# Patient Record
Sex: FEMALE | Race: BLACK OR AFRICAN AMERICAN | URBAN - METROPOLITAN AREA
[De-identification: names, ages, dates, MRNs, and addresses within clinical notes are randomized per-mention and may not be internally consistent; named-entity substitution may affect disease eponyms.]

---

## 2017-10-23 NOTE — PROCEDURE NOTE: CLINICAL
PROCEDURE NOTE: YAG Capsulotomy OD. Diagnosis: Visually Significant PCO. Anesthesia: Topical. Prior to treatment, the risks/benefits/alternatives were discussed. The patient wished to proceed with procedure. Power = 2.8 mJ. Number of pulses = 19. Patient tolerated procedure well. There were no complications. 1 gtt of Alphagan was instilled after laser. Post laser IOP = 10 mmHg. Post-procedure instructions given. Smita Ortez

## 2017-10-31 NOTE — PROCEDURE NOTE: CLINICAL
PROCEDURE NOTE: YAG Capsulotomy OS. Diagnosis: Visually Significant PCO. Anesthesia: Topical. Prior to treatment, the risks/benefits/alternatives were discussed. The patient wished to proceed with procedure. Power = 2.6 mJ. Number of pulses = 18. Patient tolerated procedure well. There were no complications. 1 gtt of Alphagan was instilled after laser. Post laser IOP = 17 mmHg. Post-procedure instructions given. Nigel Anderson

## 2017-11-07 NOTE — PATIENT DISCUSSION
mk trial framed and pt stated that he did notice a difference and that he would rosy his va that was in the trial frame and would like to proceed wih laik OD I lasik OD goal cori.

## 2019-08-30 ENCOUNTER — IMPORTED ENCOUNTER (OUTPATIENT)
Dept: URBAN - METROPOLITAN AREA CLINIC 50 | Facility: CLINIC | Age: 59
End: 2019-08-30

## 2019-09-04 ENCOUNTER — IMPORTED ENCOUNTER (OUTPATIENT)
Dept: URBAN - METROPOLITAN AREA CLINIC 50 | Facility: CLINIC | Age: 59
End: 2019-09-04

## 2019-09-04 NOTE — PATIENT DISCUSSION
"""michelle today.  verified patient has kcn ou. recommend patient continue with gas perm contact ""

## 2020-06-24 ENCOUNTER — IMPORTED ENCOUNTER (OUTPATIENT)
Dept: URBAN - METROPOLITAN AREA CLINIC 50 | Facility: CLINIC | Age: 60
End: 2020-06-24

## 2020-07-02 ENCOUNTER — IMPORTED ENCOUNTER (OUTPATIENT)
Dept: URBAN - METROPOLITAN AREA CLINIC 50 | Facility: CLINIC | Age: 60
End: 2020-07-02

## 2020-08-12 ENCOUNTER — IMPORTED ENCOUNTER (OUTPATIENT)
Dept: URBAN - METROPOLITAN AREA CLINIC 50 | Facility: CLINIC | Age: 60
End: 2020-08-12

## 2020-09-09 ENCOUNTER — IMPORTED ENCOUNTER (OUTPATIENT)
Dept: URBAN - METROPOLITAN AREA CLINIC 50 | Facility: CLINIC | Age: 60
End: 2020-09-09

## 2020-09-09 NOTE — PATIENT DISCUSSION
"""Obtained a new refraction on patient at today's visit.  Will finalize glasses prescription for patient ""

## 2020-10-16 ENCOUNTER — IMPORTED ENCOUNTER (OUTPATIENT)
Dept: URBAN - METROPOLITAN AREA CLINIC 50 | Facility: CLINIC | Age: 60
End: 2020-10-16

## 2020-10-16 NOTE — PATIENT DISCUSSION
"""Advised patient to discontinue the use of Kennan Advance and start using Simplus. Patient given final contact lens prescription.  10/16/2020"""

## 2020-11-11 NOTE — PATIENT DISCUSSION
New spectacle Rx issued to patient. Patient may continue his 2018 habitual Rx since very little change.  RTC [id number] for annual exam.

## 2021-01-21 ENCOUNTER — IMPORTED ENCOUNTER (OUTPATIENT)
Dept: URBAN - METROPOLITAN AREA CLINIC 50 | Facility: CLINIC | Age: 61
End: 2021-01-21

## 2021-05-27 ENCOUNTER — PROBLEM (OUTPATIENT)
Dept: URBAN - METROPOLITAN AREA CLINIC 50 | Facility: CLINIC | Age: 61
End: 2021-05-27

## 2021-05-27 DIAGNOSIS — E11.9: ICD-10-CM

## 2021-05-27 DIAGNOSIS — H18.002: ICD-10-CM

## 2021-05-27 PROCEDURE — 92012 INTRM OPH EXAM EST PATIENT: CPT

## 2021-05-27 PROCEDURE — 92134 CPTRZ OPH DX IMG PST SGM RTA: CPT

## 2021-05-27 PROCEDURE — 92285 EXTERNAL OCULAR PHOTOGRAPHY: CPT

## 2021-05-27 ASSESSMENT — VISUAL ACUITY
OD_PH: 20/40-2
OD_CC: 20/60+1
OS_CC: 20/70-1
OU_CC: J1@14IN
OS_PH: 20/60-1
OU_CC: 20/50 BLURRY

## 2021-05-27 NOTE — PATIENT DISCUSSION
Scattered white deposits on cornea under RGP lens. When lens was removed there was 1 protein deposit on the epithelium and 1 area of pooling where the protein was deposited on the back of the lens. Hydrapeg was ordered on her RGP lenses and that appears to be causing the protein deposits. Will not order lenses in the future with the coating. That is also likely causing the visual distortion. Will order the same RGP lenses that she has that are blue with a dot on the right.

## 2021-05-27 NOTE — PATIENT DISCUSSION
Unable to fully assess as she is not dilated. Obtained OCT at today's visit. No edema was found. Her diabetes is not likely the cause of the decrease in vision. Will continue to monitor.

## 2021-06-14 ASSESSMENT — VISUAL ACUITY
OD_CC: J1@ 16 IN
OD_CC: 20/25+3
OS_CC: J3
OS_CC: 20/25+1
OD_OTHER: 20/50-. 20/400.
OS_PH: 20/20-
OS_PH: 20/30
OS_BAT: 20/50-
OD_CC: 20/40+-
OD_CC: J3
OD_CC: 20/20
OS_CC: 20/30
OS_CC: 20/100-
OD_CC: 20/25-2
OS_CC: 20/30+2
OS_CC: J7
OD_PH: 20/25-
OS_OTHER: 20/50-. 20/60-.
OS_CC: J1@ 16 IN
OD_CC: J7
OD_BAT: 20/50-

## 2021-06-14 ASSESSMENT — TONOMETRY
OD_IOP_MMHG: 18
OS_IOP_MMHG: 14
OS_IOP_MMHG: 14
OD_IOP_MMHG: 14
OD_IOP_MMHG: 14
OS_IOP_MMHG: 14

## 2021-10-22 ENCOUNTER — ANNUAL COMPREHENSIVE EXAM (OUTPATIENT)
Dept: URBAN - METROPOLITAN AREA CLINIC 53 | Facility: CLINIC | Age: 61
End: 2021-10-22

## 2021-10-22 DIAGNOSIS — E11.9: ICD-10-CM

## 2021-10-22 DIAGNOSIS — H25.13: ICD-10-CM

## 2021-10-22 DIAGNOSIS — Z79.4: ICD-10-CM

## 2021-10-22 PROCEDURE — 92014 COMPRE OPH EXAM EST PT 1/>: CPT

## 2021-10-22 ASSESSMENT — VISUAL ACUITY
OD_GLARE: 20/40
OS_GLARE: 20/30
OS_CC: 20/25-1
OD_CC: 20/30+2
OU_CC: J1+
OS_GLARE: 20/40
OD_GLARE: 20/40

## 2021-10-22 ASSESSMENT — TONOMETRY
OD_IOP_MMHG: 14
OS_IOP_MMHG: 15

## 2021-10-22 NOTE — PATIENT DISCUSSION
No glasses Rx today. Patient sees a new doctor next week. Could not remember the name and said she would call with name so we can send diabetic letter. Patient to call once her A1c becomes stable, and we will go forth with refraction. Also discussed with patient about possibly doing cataract surgery early (once she has become stable), and therefore when her blood sugar fluctuates she would not have to worry about her vision fluctuating so much.

## 2022-03-07 ENCOUNTER — ESTABLISHED PATIENT (OUTPATIENT)
Dept: URBAN - METROPOLITAN AREA CLINIC 52 | Facility: CLINIC | Age: 62
End: 2022-03-07

## 2022-03-07 DIAGNOSIS — H18.603: ICD-10-CM

## 2022-03-07 DIAGNOSIS — H43.812: ICD-10-CM

## 2022-03-07 DIAGNOSIS — Z79.4: ICD-10-CM

## 2022-03-07 DIAGNOSIS — E11.9: ICD-10-CM

## 2022-03-07 DIAGNOSIS — H25.13: ICD-10-CM

## 2022-03-07 PROCEDURE — 92014 COMPRE OPH EXAM EST PT 1/>: CPT

## 2022-03-07 PROCEDURE — 92134 CPTRZ OPH DX IMG PST SGM RTA: CPT

## 2022-03-07 ASSESSMENT — VISUAL ACUITY
OS_CC: J1 @ 14IN
OU_CC: 20/20-2
OD_CC: 20/25
OD_CC: J1 @ 14IN
OS_CC: 20/40-1
OU_CC: J1+ @ 14IN

## 2022-03-07 ASSESSMENT — TONOMETRY
OS_IOP_MMHG: 15
OD_IOP_MMHG: 14

## 2022-06-07 ENCOUNTER — ESTABLISHED PATIENT (OUTPATIENT)
Dept: URBAN - METROPOLITAN AREA CLINIC 53 | Facility: CLINIC | Age: 62
End: 2022-06-07

## 2022-06-07 DIAGNOSIS — E11.9: ICD-10-CM

## 2022-06-07 DIAGNOSIS — H43.813: ICD-10-CM

## 2022-06-07 DIAGNOSIS — H25.13: ICD-10-CM

## 2022-06-07 DIAGNOSIS — Z79.4: ICD-10-CM

## 2022-06-07 PROCEDURE — 92014 COMPRE OPH EXAM EST PT 1/>: CPT

## 2022-06-07 PROCEDURE — 92134 CPTRZ OPH DX IMG PST SGM RTA: CPT

## 2022-06-07 ASSESSMENT — VISUAL ACUITY
OS_GLARE: 20/50
OD_PH: 20/30
OD_GLARE: 20/30
OS_GLARE: 20/60
OS_CC: 20/50
OD_GLARE: 20/50
OD_CC: 20/50-2

## 2022-06-07 ASSESSMENT — TONOMETRY
OS_IOP_MMHG: 14
OD_IOP_MMHG: 15

## 2022-06-07 NOTE — PATIENT DISCUSSION
New spectacle Rx issued to patient. Patient may continue his 2018 habitual Rx since very little change.  RTC [id number] for annual exam.

## 2022-06-07 NOTE — PATIENT DISCUSSION
Patient is asymptomatic to upper lids at this time. Superior lids are not sufficient to interfere with line of sight; Monitor only.

## 2022-06-07 NOTE — PATIENT DISCUSSION
Patient has an appointment scheduled in July with her new endocrinologist. Patient is not sure of their name.

## 2022-06-07 NOTE — PATIENT DISCUSSION
Patient has been having vision fluctuation due to her blood sugar fluctuation. Patient to call once her A1c becomes stable, and we will go forth with refraction.

## 2022-11-03 ENCOUNTER — ESTABLISHED PATIENT (OUTPATIENT)
Dept: URBAN - METROPOLITAN AREA CLINIC 50 | Facility: CLINIC | Age: 62
End: 2022-11-03

## 2022-11-03 DIAGNOSIS — Z97.3: ICD-10-CM

## 2022-11-03 DIAGNOSIS — H18.603: ICD-10-CM

## 2022-11-03 DIAGNOSIS — Z79.4: ICD-10-CM

## 2022-11-03 DIAGNOSIS — E11.9: ICD-10-CM

## 2022-11-03 DIAGNOSIS — H52.13: ICD-10-CM

## 2022-11-03 PROCEDURE — 92012 INTRM OPH EXAM EST PATIENT: CPT

## 2022-11-03 ASSESSMENT — TONOMETRY
OS_IOP_MMHG: 15
OD_IOP_MMHG: 15

## 2022-11-03 ASSESSMENT — VISUAL ACUITY
OU_CC: 20/25+2
OS_CC: 20/25
OD_CC: 20/25

## 2022-11-03 NOTE — PATIENT DISCUSSION
D/w patient the need to be out of RGPs for 48 hours at the least for a more accurate refraction. Patient understands and will schedule a refraction with a senior tech on a Monday. Patient aware she will need a ride to appointment and that after the prescription is finalized she may wear them again. Patient is also aware her glasses will not be as clear than it is in RGPs due to her keratoconus.

## 2022-11-28 ENCOUNTER — CONTACT LENSES/GLASSES VISIT (OUTPATIENT)
Dept: URBAN - METROPOLITAN AREA CLINIC 50 | Facility: CLINIC | Age: 62
End: 2022-11-28

## 2022-11-28 DIAGNOSIS — H52.13: ICD-10-CM

## 2022-11-28 DIAGNOSIS — H18.603: ICD-10-CM

## 2022-11-28 PROCEDURE — 92015 DETERMINE REFRACTIVE STATE: CPT

## 2022-11-28 ASSESSMENT — VISUAL ACUITY
OS_SC: 20/100+
OD_SC: 20/70+1

## 2022-11-28 ASSESSMENT — KERATOMETRY
OD_K2POWER_DIOPTERS: 44.75
OD_AXISANGLE_DEGREES: 105
OD_AXISANGLE2_DEGREES: 15
OS_AXISANGLE2_DEGREES: 166
OD_K1POWER_DIOPTERS: 48.00
OS_K2POWER_DIOPTERS: 44.50
OS_K1POWER_DIOPTERS: 48.75
OS_AXISANGLE_DEGREES: 76

## 2023-04-07 ENCOUNTER — ESTABLISHED PATIENT (OUTPATIENT)
Dept: URBAN - METROPOLITAN AREA CLINIC 24 | Facility: CLINIC | Age: 63
End: 2023-04-07

## 2023-04-07 DIAGNOSIS — H18.603: ICD-10-CM

## 2023-04-07 DIAGNOSIS — H52.13: ICD-10-CM

## 2023-04-07 PROCEDURE — 92310N CONTACT LENS F/U, 3 OR LESS N/C

## 2023-04-07 ASSESSMENT — KERATOMETRY
OS_K2POWER_DIOPTERS: 44.50
OS_K1POWER_DIOPTERS: 48.75
OD_K2POWER_DIOPTERS: 44.75
OD_AXISANGLE_DEGREES: 105
OD_K1POWER_DIOPTERS: 48.00
OD_AXISANGLE2_DEGREES: 15
OS_AXISANGLE2_DEGREES: 166
OS_AXISANGLE_DEGREES: 76

## 2023-04-07 ASSESSMENT — VISUAL ACUITY
OS_CC: 20/25
OD_CC: 20/25
OU_CC: 20/20

## 2024-03-15 ENCOUNTER — APPOINTMENT (RX ONLY)
Dept: URBAN - METROPOLITAN AREA CLINIC 58 | Facility: CLINIC | Age: 64
Setting detail: DERMATOLOGY
End: 2024-03-15

## 2024-03-15 DIAGNOSIS — L82.1 OTHER SEBORRHEIC KERATOSIS: ICD-10-CM

## 2024-03-15 DIAGNOSIS — L82.0 INFLAMED SEBORRHEIC KERATOSIS: ICD-10-CM | Status: WORSENING

## 2024-03-15 DIAGNOSIS — L72.11 PILAR CYST: ICD-10-CM | Status: STABLE

## 2024-03-15 DIAGNOSIS — L91.8 OTHER HYPERTROPHIC DISORDERS OF THE SKIN: ICD-10-CM | Status: STABLE

## 2024-03-15 DIAGNOSIS — L73.8 OTHER SPECIFIED FOLLICULAR DISORDERS: ICD-10-CM | Status: STABLE

## 2024-03-15 DIAGNOSIS — L72.8 OTHER FOLLICULAR CYSTS OF THE SKIN AND SUBCUTANEOUS TISSUE: ICD-10-CM | Status: RESOLVING

## 2024-03-15 PROCEDURE — ? FULL BODY SKIN EXAM - DECLINED

## 2024-03-15 PROCEDURE — 17110 DESTRUCTION B9 LES UP TO 14: CPT

## 2024-03-15 PROCEDURE — ? COUNSELING

## 2024-03-15 PROCEDURE — 99202 OFFICE O/P NEW SF 15 MIN: CPT | Mod: 25

## 2024-03-15 PROCEDURE — ? LIQUID NITROGEN

## 2024-03-15 ASSESSMENT — LOCATION SIMPLE DESCRIPTION DERM
LOCATION SIMPLE: RIGHT ANTERIOR AXILLA
LOCATION SIMPLE: SCALP
LOCATION SIMPLE: LEFT CHEEK
LOCATION SIMPLE: RIGHT CLAVICULAR SKIN
LOCATION SIMPLE: LEFT ANTERIOR NECK
LOCATION SIMPLE: CHEST
LOCATION SIMPLE: RIGHT ANTERIOR NECK

## 2024-03-15 ASSESSMENT — LOCATION ZONE DERM
LOCATION ZONE: SCALP
LOCATION ZONE: FACE
LOCATION ZONE: NECK
LOCATION ZONE: AXILLAE
LOCATION ZONE: TRUNK

## 2024-03-15 ASSESSMENT — LOCATION DETAILED DESCRIPTION DERM
LOCATION DETAILED: LEFT INFERIOR LATERAL NECK
LOCATION DETAILED: RIGHT MEDIAL SUPERIOR CHEST
LOCATION DETAILED: LEFT SUPERIOR PARIETAL SCALP
LOCATION DETAILED: LEFT LATERAL SUPERIOR CHEST
LOCATION DETAILED: LEFT MEDIAL SUPERIOR CHEST
LOCATION DETAILED: STERNAL NOTCH
LOCATION DETAILED: RIGHT INFERIOR LATERAL NECK
LOCATION DETAILED: RIGHT CLAVICULAR SKIN
LOCATION DETAILED: RIGHT SUPERIOR PARIETAL SCALP
LOCATION DETAILED: UPPER STERNUM
LOCATION DETAILED: LEFT INFERIOR CENTRAL MALAR CHEEK
LOCATION DETAILED: RIGHT ANTERIOR AXILLA

## 2024-03-15 NOTE — HPI: CYST
How Severe Is Your Cyst?: mild
Is This A New Presentation, Or A Follow-Up?: Cyst
Additional History: Patient reports cyst ruptured and was treated by the ER and wound care.

## 2024-03-15 NOTE — PROCEDURE: LIQUID NITROGEN
Medical Necessity Information: It is in your best interest to select a reason for this procedure from the list below. All of these items fulfill various CMS LCD requirements except the new and changing color options.
Render Note In Bullet Format When Appropriate: No
Medical Necessity Clause: This procedure was medically necessary because the lesions that were treated were:
Show Aperture Variable?: Yes
Detail Level: Detailed
Spray Paint Text: The liquid nitrogen was applied to the skin utilizing a spray paint frosting technique.
Post-Care Instructions: I reviewed with the patient in detail post-care instructions. Patient is to wear sunprotection, and avoid picking at any of the treated lesions. Pt may apply Vaseline to crusted or scabbing areas.
Consent: The patient's consent was obtained including but not limited to risks of crusting, scabbing, blistering, scarring, darker or lighter pigmentary change, recurrence, incomplete removal and infection.

## 2024-07-08 ENCOUNTER — COMPREHENSIVE EXAM (OUTPATIENT)
Dept: URBAN - METROPOLITAN AREA CLINIC 24 | Facility: CLINIC | Age: 64
End: 2024-07-08

## 2024-07-08 DIAGNOSIS — Z79.4: ICD-10-CM

## 2024-07-08 DIAGNOSIS — E11.9: ICD-10-CM

## 2024-07-08 DIAGNOSIS — H25.811: ICD-10-CM

## 2024-07-08 DIAGNOSIS — H43.813: ICD-10-CM

## 2024-07-08 DIAGNOSIS — H25.12: ICD-10-CM

## 2024-07-08 DIAGNOSIS — H18.603: ICD-10-CM

## 2024-07-08 PROCEDURE — 99214 OFFICE O/P EST MOD 30 MIN: CPT

## 2024-07-08 ASSESSMENT — VISUAL ACUITY
OS_CC: J1@16
OS_CC: 20/40
OU_CC: 20/30-2
OD_CC: 20/30-2
OU_CC: J1@16
OD_CC: J1@16

## 2024-07-08 ASSESSMENT — TONOMETRY
OS_IOP_MMHG: 15
OD_IOP_MMHG: 14

## 2024-09-20 ENCOUNTER — APPOINTMENT (RX ONLY)
Dept: URBAN - METROPOLITAN AREA CLINIC 342 | Facility: CLINIC | Age: 64
Setting detail: DERMATOLOGY
End: 2024-09-20

## 2024-09-20 DIAGNOSIS — D22 MELANOCYTIC NEVI: ICD-10-CM

## 2024-09-20 DIAGNOSIS — Z12.83 ENCOUNTER FOR SCREENING FOR MALIGNANT NEOPLASM OF SKIN: ICD-10-CM

## 2024-09-20 DIAGNOSIS — L82.1 OTHER SEBORRHEIC KERATOSIS: ICD-10-CM

## 2024-09-20 DIAGNOSIS — L72.8 OTHER FOLLICULAR CYSTS OF THE SKIN AND SUBCUTANEOUS TISSUE: ICD-10-CM

## 2024-09-20 DIAGNOSIS — L81.4 OTHER MELANIN HYPERPIGMENTATION: ICD-10-CM

## 2024-09-20 PROBLEM — D48.5 NEOPLASM OF UNCERTAIN BEHAVIOR OF SKIN: Status: ACTIVE | Noted: 2024-09-20

## 2024-09-20 PROBLEM — D22.5 MELANOCYTIC NEVI OF TRUNK: Status: ACTIVE | Noted: 2024-09-20

## 2024-09-20 PROCEDURE — ? PRESCRIPTION MEDICATION MANAGEMENT

## 2024-09-20 PROCEDURE — ? BIOPSY BY SHAVE METHOD

## 2024-09-20 PROCEDURE — 11102 TANGNTL BX SKIN SINGLE LES: CPT

## 2024-09-20 PROCEDURE — 99214 OFFICE O/P EST MOD 30 MIN: CPT | Mod: 25

## 2024-09-20 PROCEDURE — ? TREATMENT REGIMEN

## 2024-09-20 PROCEDURE — ? FULL BODY SKIN EXAM

## 2024-09-20 PROCEDURE — ? PRESCRIPTION

## 2024-09-20 PROCEDURE — ? COUNSELING

## 2024-09-20 RX ORDER — DOXYCYCLINE HYCLATE 100 MG/1
TAKE ONE CAPSULE, GELATIN COATED ORAL BID
Qty: 14 | Refills: 0 | Status: ERX | COMMUNITY
Start: 2024-09-20

## 2024-09-20 RX ADMIN — DOXYCYCLINE HYCLATE TAKE ONE: 100 CAPSULE, GELATIN COATED ORAL at 00:00

## 2024-09-20 ASSESSMENT — LOCATION SIMPLE DESCRIPTION DERM
LOCATION SIMPLE: CHEST
LOCATION SIMPLE: RIGHT AXILLARY VAULT
LOCATION SIMPLE: RIGHT FOREARM
LOCATION SIMPLE: RIGHT UPPER BACK
LOCATION SIMPLE: LEFT FOREARM
LOCATION SIMPLE: LEFT UPPER BACK
LOCATION SIMPLE: LEFT FOREHEAD

## 2024-09-20 ASSESSMENT — LOCATION DETAILED DESCRIPTION DERM
LOCATION DETAILED: LEFT MID-UPPER BACK
LOCATION DETAILED: RIGHT AXILLARY VAULT
LOCATION DETAILED: LEFT LATERAL SUPERIOR CHEST
LOCATION DETAILED: RIGHT PROXIMAL DORSAL FOREARM
LOCATION DETAILED: RIGHT MID-UPPER BACK
LOCATION DETAILED: LEFT LATERAL FOREHEAD
LOCATION DETAILED: LEFT PROXIMAL DORSAL FOREARM

## 2024-09-20 ASSESSMENT — LOCATION ZONE DERM
LOCATION ZONE: AXILLAE
LOCATION ZONE: ARM
LOCATION ZONE: FACE
LOCATION ZONE: TRUNK

## 2024-09-20 NOTE — PROCEDURE: FULL BODY SKIN EXAM
From: Nithya Erickson  To: Sangeeta Sweeney  Sent: 10/15/2021 11:16 AM CDT  Subject: Follow up    Pawan Rutherford,   I see hat you have a follow up with me next week but also have been following with Dr. Benoit. I just wanted you to be aware that you do not need to follow with both of us. She's the migraine specialist so I see that she has an in depth plan for you. That being said, I'm happy to keep seeing you if you wanted, but I just wanted to let you know your options. I know it can get to be a bit much taking off work for various appointments.       Hope you're doing well otherwise.     -Nithya   
Detail Level: Simple
Price (Do Not Change): 0.00
Instructions: This plan will send the code FBSE to the PM system.  DO NOT or CHANGE the price.

## 2025-03-24 ENCOUNTER — APPOINTMENT (OUTPATIENT)
Age: 65
Setting detail: DERMATOLOGY
End: 2025-03-24

## 2025-03-24 DIAGNOSIS — L91.8 OTHER HYPERTROPHIC DISORDERS OF THE SKIN: ICD-10-CM | Status: STABLE

## 2025-03-24 DIAGNOSIS — L57.8 OTHER SKIN CHANGES DUE TO CHRONIC EXPOSURE TO NONIONIZING RADIATION: ICD-10-CM

## 2025-03-24 DIAGNOSIS — D22 MELANOCYTIC NEVI: ICD-10-CM | Status: STABLE

## 2025-03-24 DIAGNOSIS — L81.4 OTHER MELANIN HYPERPIGMENTATION: ICD-10-CM | Status: STABLE

## 2025-03-24 DIAGNOSIS — L82.1 OTHER SEBORRHEIC KERATOSIS: ICD-10-CM | Status: STABLE

## 2025-03-24 DIAGNOSIS — L82.0 INFLAMED SEBORRHEIC KERATOSIS: ICD-10-CM | Status: WORSENING

## 2025-03-24 DIAGNOSIS — D18.0 HEMANGIOMA: ICD-10-CM | Status: STABLE

## 2025-03-24 PROBLEM — D18.01 HEMANGIOMA OF SKIN AND SUBCUTANEOUS TISSUE: Status: ACTIVE | Noted: 2025-03-24

## 2025-03-24 PROBLEM — D22.5 MELANOCYTIC NEVI OF TRUNK: Status: ACTIVE | Noted: 2025-03-24

## 2025-03-24 PROCEDURE — ? ADDITIONAL NOTES

## 2025-03-24 PROCEDURE — 99213 OFFICE O/P EST LOW 20 MIN: CPT | Mod: 25

## 2025-03-24 PROCEDURE — ? LIQUID NITROGEN

## 2025-03-24 PROCEDURE — 17110 DESTRUCTION B9 LES UP TO 14: CPT

## 2025-03-24 PROCEDURE — ? COUNSELING

## 2025-03-24 PROCEDURE — ? TREATMENT REGIMEN

## 2025-03-24 ASSESSMENT — LOCATION SIMPLE DESCRIPTION DERM
LOCATION SIMPLE: LEFT FOREHEAD
LOCATION SIMPLE: RIGHT SHOULDER
LOCATION SIMPLE: RIGHT AXILLARY VAULT
LOCATION SIMPLE: LEFT ANTERIOR NECK
LOCATION SIMPLE: ABDOMEN
LOCATION SIMPLE: RIGHT ANTERIOR NECK
LOCATION SIMPLE: LEFT AXILLARY VAULT

## 2025-03-24 ASSESSMENT — LOCATION ZONE DERM
LOCATION ZONE: NECK
LOCATION ZONE: ARM
LOCATION ZONE: FACE
LOCATION ZONE: AXILLAE
LOCATION ZONE: TRUNK

## 2025-03-24 ASSESSMENT — LOCATION DETAILED DESCRIPTION DERM
LOCATION DETAILED: EPIGASTRIC SKIN
LOCATION DETAILED: RIGHT INFERIOR ANTERIOR NECK
LOCATION DETAILED: LEFT MEDIAL FOREHEAD
LOCATION DETAILED: LEFT AXILLARY VAULT
LOCATION DETAILED: RIGHT ANTERIOR SHOULDER
LOCATION DETAILED: LEFT INFERIOR LATERAL NECK
LOCATION DETAILED: RIGHT AXILLARY VAULT
LOCATION DETAILED: RIGHT CLAVICULAR NECK
LOCATION DETAILED: LEFT CLAVICULAR NECK
LOCATION DETAILED: RIGHT INFERIOR LATERAL NECK
LOCATION DETAILED: PERIUMBILICAL SKIN

## 2025-03-24 NOTE — PROCEDURE: ADDITIONAL NOTES
Render Risk Assessment In Note?: no
Detail Level: Zone
Additional Notes: Referred to YOVANNY Harper for consultation.

## 2025-03-24 NOTE — PROCEDURE: LIQUID NITROGEN
Post-Care Instructions: I reviewed with the patient in detail post-care instructions. Patient is to wear sunprotection, and avoid picking at any of the treated lesions. Pt may apply Vaseline to crusted or scabbing areas.
Spray Paint Text: The liquid nitrogen was applied to the skin utilizing a spray paint frosting technique.
Show Applicator Variable?: Yes
Detail Level: Detailed
Add 52 Modifier (Optional): no
Consent: The patient's consent was obtained including but not limited to risks of crusting, scabbing, blistering, scarring, darker or lighter pigmentary change, recurrence, incomplete removal and infection.
Medical Necessity Information: It is in your best interest to select a reason for this procedure from the list below. All of these items fulfill various CMS LCD requirements except the new and changing color options.
Medical Necessity Clause: This procedure was medically necessary because the lesions that were treated were:

## 2025-05-06 ENCOUNTER — CONSULTATION/EVALUATION (OUTPATIENT)
Age: 65
End: 2025-05-06

## 2025-05-06 DIAGNOSIS — H43.813: ICD-10-CM

## 2025-05-06 DIAGNOSIS — H52.13: ICD-10-CM

## 2025-05-06 DIAGNOSIS — H25.811: ICD-10-CM

## 2025-05-06 DIAGNOSIS — Z79.4: ICD-10-CM

## 2025-05-06 DIAGNOSIS — E11.9: ICD-10-CM

## 2025-05-06 DIAGNOSIS — Z97.3: ICD-10-CM

## 2025-05-06 DIAGNOSIS — H25.12: ICD-10-CM

## 2025-05-06 PROCEDURE — 92134 CPTRZ OPH DX IMG PST SGM RTA: CPT

## 2025-05-06 PROCEDURE — 99214 OFFICE O/P EST MOD 30 MIN: CPT

## 2025-05-06 PROCEDURE — 92015 DETERMINE REFRACTIVE STATE: CPT

## 2025-05-22 ENCOUNTER — CONTACT LENSES/GLASSES VISIT (OUTPATIENT)
Age: 65
End: 2025-05-22

## 2025-05-22 DIAGNOSIS — H52.13: ICD-10-CM

## 2025-05-22 PROCEDURE — 92015GRNC REFRACTION GLASSES RECHECK NO CHARGE

## 2025-07-08 ENCOUNTER — APPOINTMENT (OUTPATIENT)
Age: 65
Setting detail: DERMATOLOGY
End: 2025-07-08

## 2025-07-08 DIAGNOSIS — L82.1 OTHER SEBORRHEIC KERATOSIS: ICD-10-CM | Status: STABLE

## 2025-07-08 DIAGNOSIS — L21.8 OTHER SEBORRHEIC DERMATITIS: ICD-10-CM | Status: INADEQUATELY CONTROLLED

## 2025-07-08 DIAGNOSIS — D23.4 OTHER BENIGN NEOPLASM OF SKIN OF SCALP AND NECK: ICD-10-CM | Status: INADEQUATELY CONTROLLED

## 2025-07-08 PROBLEM — L30.9 DERMATITIS, UNSPECIFIED: Status: ACTIVE | Noted: 2025-07-08

## 2025-07-08 PROCEDURE — ? PRESCRIPTION MEDICATION MANAGEMENT

## 2025-07-08 PROCEDURE — ? PRESCRIPTION

## 2025-07-08 PROCEDURE — ? FULL BODY SKIN EXAM - DECLINED

## 2025-07-08 PROCEDURE — ? MDM - TREATMENT GOALS

## 2025-07-08 PROCEDURE — ? ADDITIONAL NOTES

## 2025-07-08 PROCEDURE — ? COUNSELING

## 2025-07-08 RX ORDER — CLOBETASOL PROPIONATE 0.5 MG/ML
SOLUTION TOPICAL
Qty: 50 | Refills: 2 | Status: ERX

## 2025-07-08 ASSESSMENT — LOCATION ZONE DERM
LOCATION ZONE: TRUNK
LOCATION ZONE: SCALP

## 2025-07-08 ASSESSMENT — LOCATION SIMPLE DESCRIPTION DERM
LOCATION SIMPLE: SCALP
LOCATION SIMPLE: POSTERIOR SCALP
LOCATION SIMPLE: UPPER BACK

## 2025-07-08 ASSESSMENT — LOCATION DETAILED DESCRIPTION DERM
LOCATION DETAILED: POSTERIOR MID-PARIETAL SCALP
LOCATION DETAILED: MID-OCCIPITAL SCALP
LOCATION DETAILED: LEFT SUPERIOR PARIETAL SCALP
LOCATION DETAILED: INFERIOR THORACIC SPINE

## 2025-07-08 NOTE — PROCEDURE: PRESCRIPTION MEDICATION MANAGEMENT
Render In Strict Bullet Format?: No
Detail Level: Zone
Initiate Treatment: clobetasol 0.05 % scalp solution (Apply to affected areas on scalp BID for up to 3 weeks, then take 1 week off)
Initiate Treatment: clobetasol 0.05 % scalp solution \\nQuantity: 50.0 ml  Days Supply: 14\\nSig: Apply to affected areas on scalp BID for up to 3 weeks, then take 1 week off. Repeat as needed.

## 2025-07-08 NOTE — PROCEDURE: ADDITIONAL NOTES
Detail Level: Zone
Additional Notes: Patient to follow-up in 2 months.
Render Risk Assessment In Note?: no

## 2025-08-20 ENCOUNTER — APPOINTMENT (OUTPATIENT)
Age: 65
Setting detail: DERMATOLOGY
End: 2025-08-20

## 2025-08-20 DIAGNOSIS — D23.4 OTHER BENIGN NEOPLASM OF SKIN OF SCALP AND NECK: ICD-10-CM

## 2025-08-20 PROCEDURE — ? ADDITIONAL NOTES

## 2025-08-20 PROCEDURE — ? COUNSELING

## 2025-08-20 PROCEDURE — ? PRESCRIPTION MEDICATION MANAGEMENT

## 2025-08-20 ASSESSMENT — LOCATION ZONE DERM: LOCATION ZONE: SCALP

## 2025-08-20 ASSESSMENT — LOCATION DETAILED DESCRIPTION DERM: LOCATION DETAILED: POSTERIOR MID-PARIETAL SCALP

## 2025-08-20 ASSESSMENT — LOCATION SIMPLE DESCRIPTION DERM: LOCATION SIMPLE: POSTERIOR SCALP
